# Patient Record
Sex: MALE | Race: WHITE | NOT HISPANIC OR LATINO | Employment: FULL TIME | URBAN - METROPOLITAN AREA
[De-identification: names, ages, dates, MRNs, and addresses within clinical notes are randomized per-mention and may not be internally consistent; named-entity substitution may affect disease eponyms.]

---

## 2017-10-17 ENCOUNTER — CLINICAL SUPPORT (OUTPATIENT)
Dept: OCCUPATIONAL MEDICINE | Facility: CLINIC | Age: 42
End: 2017-10-17
Payer: MEDICAID

## 2017-10-17 VITALS
RESPIRATION RATE: 18 BRPM | OXYGEN SATURATION: 98 % | HEIGHT: 69 IN | HEART RATE: 80 BPM | SYSTOLIC BLOOD PRESSURE: 120 MMHG | TEMPERATURE: 97 F | BODY MASS INDEX: 23.4 KG/M2 | WEIGHT: 158 LBS | DIASTOLIC BLOOD PRESSURE: 72 MMHG

## 2017-10-17 DIAGNOSIS — Z02.1 PHYSICAL EXAM, PRE-EMPLOYMENT: ICD-10-CM

## 2017-10-17 DIAGNOSIS — Z02.1 PRE-EMPLOYMENT DRUG TESTING: Primary | ICD-10-CM

## 2017-10-17 PROCEDURE — 99499 UNLISTED E&M SERVICE: CPT | Mod: S$GLB,,, | Performed by: EMERGENCY MEDICINE

## 2017-10-17 PROCEDURE — 80306 DRUG TEST PRSMV INSTRMNT: CPT | Mod: S$GLB,,, | Performed by: EMERGENCY MEDICINE

## 2017-10-17 NOTE — PROGRESS NOTES
Subjective:       Patient ID: Rubio Mcmanus is a 41 y.o. male.    Chief Complaint: Drug / Alcohol Assessment    HPI  ROS    Objective:      Physical Exam  Physical exam scanned to   Type of Interpretation: ED Physician (Independently Interpreted).  Radiology Procedure Done: Lumbar Spine X-Ray.  Interpretation: No acute findings      Assessment:       1. Pre-employment drug testing    2. Physical exam, pre-employment        Plan:                   Return if symptoms worsen or fail to improve.

## 2019-04-05 ENCOUNTER — OFFICE VISIT (OUTPATIENT)
Dept: URGENT CARE | Facility: CLINIC | Age: 44
End: 2019-04-05
Payer: OTHER MISCELLANEOUS

## 2019-04-05 VITALS
WEIGHT: 180 LBS | HEIGHT: 68 IN | DIASTOLIC BLOOD PRESSURE: 76 MMHG | SYSTOLIC BLOOD PRESSURE: 126 MMHG | BODY MASS INDEX: 27.28 KG/M2 | HEART RATE: 80 BPM | TEMPERATURE: 99 F

## 2019-04-05 DIAGNOSIS — S50.01XA CONTUSION OF RIGHT ELBOW, INITIAL ENCOUNTER: Primary | ICD-10-CM

## 2019-04-05 DIAGNOSIS — Z02.6 ENCOUNTER RELATED TO WORKER'S COMPENSATION CLAIM: ICD-10-CM

## 2019-04-05 DIAGNOSIS — M25.521 RIGHT ELBOW PAIN: ICD-10-CM

## 2019-04-05 PROCEDURE — 99203 PR OFFICE/OUTPT VISIT, NEW, LEVL III, 30-44 MIN: ICD-10-PCS | Mod: S$GLB,,, | Performed by: PHYSICIAN ASSISTANT

## 2019-04-05 PROCEDURE — 73080 XR ELBOW COMPLETE 3 VIEW RIGHT: ICD-10-PCS | Mod: FY,RT,S$GLB, | Performed by: RADIOLOGY

## 2019-04-05 PROCEDURE — 80306 PR DOT DRUG SCREENS: ICD-10-PCS | Mod: S$GLB,,, | Performed by: PHYSICIAN ASSISTANT

## 2019-04-05 PROCEDURE — 99203 OFFICE O/P NEW LOW 30 MIN: CPT | Mod: S$GLB,,, | Performed by: PHYSICIAN ASSISTANT

## 2019-04-05 PROCEDURE — 80306 DRUG TEST PRSMV INSTRMNT: CPT | Mod: S$GLB,,, | Performed by: PHYSICIAN ASSISTANT

## 2019-04-05 PROCEDURE — 73080 X-RAY EXAM OF ELBOW: CPT | Mod: FY,RT,S$GLB, | Performed by: RADIOLOGY

## 2019-04-05 NOTE — PROGRESS NOTES
Subjective:       Patient ID: Rubio Mcmanus Jr. is a 43 y.o. male.    Chief Complaint: Elbow Injury (right 3/29/19 @ 1600)    Patient is a  for [employer]. Patient states on 3/29/19 2 1600 while pulling on a wire the slack gave and he fell back hitting his right elbow on the corner of the barge. Patient continued working his shift that day and every day after but today he made it worse pulling on a line. Patient states no prior injury to area, no treatment. Patient is right handed. Patient c/o a shocking pain to right elbow and tingling in the fingers. Ambulatory. MJB    Elbow Injury   This is a new problem. The current episode started in the past 7 days. The problem occurs daily. Associated symptoms include joint swelling. Pertinent negatives include no abdominal pain, chest pain, chills, coughing, fever, headaches, nausea, neck pain, numbness, rash, sore throat or vomiting. The symptoms are aggravated by exertion and bending. He has tried nothing for the symptoms.     Review of Systems   Constitution: Negative for chills and fever.   HENT: Negative for hearing loss, nosebleeds and sore throat.    Eyes: Negative for blurred vision and redness.   Cardiovascular: Negative for chest pain and syncope.   Respiratory: Negative for cough and shortness of breath.    Hematologic/Lymphatic: Negative for bleeding problem.   Skin: Negative for color change and rash.   Musculoskeletal: Positive for joint pain and joint swelling. Negative for back pain and neck pain.   Gastrointestinal: Negative for abdominal pain, diarrhea, nausea and vomiting.   Neurological: Negative for headaches, numbness and paresthesias.   Psychiatric/Behavioral: The patient is not nervous/anxious.        Objective:      Physical Exam   Constitutional: He appears well-developed and well-nourished. He is active. No distress.   HENT:   Head: Normocephalic and atraumatic.   Right Ear: Hearing and external ear normal.   Left Ear: Hearing and external  ear normal.   Nose: Nose normal. No nasal deformity. No epistaxis.   Mouth/Throat: Oropharynx is clear and moist and mucous membranes are normal.   Eyes: Conjunctivae and lids are normal. No scleral icterus.   Neck: Trachea normal and normal range of motion.   Cardiovascular: Intact distal pulses and normal pulses.   Pulses:       Radial pulses are 2+ on the right side.   Pulmonary/Chest: Effort normal. No stridor. No respiratory distress.   Musculoskeletal:        Right elbow: He exhibits normal range of motion and no swelling. Tenderness found. Medial epicondyle tenderness noted. No radial head, no lateral epicondyle and no olecranon process tenderness noted.   Neurological: He is alert. He has normal strength. He is not disoriented. No sensory deficit. GCS eye subscore is 4. GCS verbal subscore is 5. GCS motor subscore is 6.   Skin: Skin is warm, dry and intact. Capillary refill takes less than 2 seconds. He is not diaphoretic.   Psychiatric: He has a normal mood and affect. His speech is normal and behavior is normal. He is attentive.   Nursing note and vitals reviewed.      Assessment:       1. Contusion of right elbow, initial encounter    2. Encounter related to worker's compensation claim    3. Right elbow pain        Plan:                Restrictions: Regular Duty  Follow up in about 1 week (around 4/12/2019).        Patient Instructions     Elbow Bruise  You have a bruise (contusion) of your elbow. A bruise causes local pain, swelling, and sometimes bruising. There are no broken bones. This injury takes a few days to a few weeks to heal. You may be given a sling for comfort and arm support.  You may notice color changes over the skin. It may change from reddish to bluish to greenish or yellowish before the bruising fades. The skin will then go back to its normal color.  Home care  Follow these guidelines when caring for yourself at home.  · Keep your arm elevated to reduce pain and swelling. This is most  important during the first 2 days (48 hours) after the injury.  · Put an ice pack on the injured area. Do this for 20 minutes every 1 to 2 hours the first day. You can make an ice pack by wrapping a plastic bag of ice in a thin towel. You should continue to use the ice pack 3 to 4 times a day for the next 2 days. Then use the ice pack as needed to ease pain and swelling.  · Dont use a heating pad.  · Dont stick a needle into the contusion or bruising to drain it.  · You may use acetaminophen or ibuprofen to control pain, unless another pain medicine was prescribed. If you have chronic liver or kidney disease, talk with your healthcare provider before using these medicines. Also talk with your provider if youve had a stomach ulcer or gastrointestinal bleeding.  · If a sling was provided, you may take it off to shower or bathe. Dont wear it for more than 1 week or it may cause joint stiffness.  Follow-up care  Follow up with your healthcare provider, or as advised, if you are not starting to get better within the next 3 days.  When to seek medical advice  Call your healthcare provider right away if any of these occur:  · Pain or swelling gets worse  · The back of your elbow becomes very swollen where it almost looks like a gold ball or egg-like mass is growing there. This is a sign of olecrenon bursitis or septic bursitis which may need immediate treatment.  · Redness, red streaks down the arm, warmth, or drainage from the bruise  · Hand or fingers becomes cold, blue, numb, or tingly  · New bruises, and you dont know what caused them  · Contusion doesnt heal  Date Last Reviewed: 2/1/2017  © 0058-8696 [employer]. [address] [place] [address], [place] [address]. All rights reserved. This information is not intended as a substitute for professional medical care. Always follow your healthcare professional's instructions.

## 2019-04-05 NOTE — PATIENT INSTRUCTIONS
Elbow Bruise  You have a bruise (contusion) of your elbow. A bruise causes local pain, swelling, and sometimes bruising. There are no broken bones. This injury takes a few days to a few weeks to heal. You may be given a sling for comfort and arm support.  You may notice color changes over the skin. It may change from reddish to bluish to greenish or yellowish before the bruising fades. The skin will then go back to its normal color.  Home care  Follow these guidelines when caring for yourself at home.  · Keep your arm elevated to reduce pain and swelling. This is most important during the first 2 days (48 hours) after the injury.  · Put an ice pack on the injured area. Do this for 20 minutes every 1 to 2 hours the first day. You can make an ice pack by wrapping a plastic bag of ice in a thin towel. You should continue to use the ice pack 3 to 4 times a day for the next 2 days. Then use the ice pack as needed to ease pain and swelling.  · Dont use a heating pad.  · Dont stick a needle into the contusion or bruising to drain it.  · You may use acetaminophen or ibuprofen to control pain, unless another pain medicine was prescribed. If you have chronic liver or kidney disease, talk with your healthcare provider before using these medicines. Also talk with your provider if youve had a stomach ulcer or gastrointestinal bleeding.  · If a sling was provided, you may take it off to shower or bathe. Dont wear it for more than 1 week or it may cause joint stiffness.  Follow-up care  Follow up with your healthcare provider, or as advised, if you are not starting to get better within the next 3 days.  When to seek medical advice  Call your healthcare provider right away if any of these occur:  · Pain or swelling gets worse  · The back of your elbow becomes very swollen where it almost looks like a gold ball or egg-like mass is growing there. This is a sign of olecrenon bursitis or septic bursitis which may need immediate  treatment.  · Redness, red streaks down the arm, warmth, or drainage from the bruise  · Hand or fingers becomes cold, blue, numb, or tingly  · New bruises, and you dont know what caused them  · Contusion doesnt heal  Date Last Reviewed: 2/1/2017  © 5805-8274 [employer]. [address] [place] [address], [place] [address]. All rights reserved. This information is not intended as a substitute for professional medical care. Always follow your healthcare professional's instructions.

## 2019-04-05 NOTE — LETTER
[facility] [place]  [place] [address]  Phone: [phone]  Fax: [phone]  [facility]: [phone]    Pt Name: Rubio Mcmanus Jr.  Injury Date: 03/29/2019   Employee ID:  Date of Treatment: 04/05/2019   Company: [id number]      Appointment Time: 03:10 PM Arrived: 1520 PM   Provider: Hunter Raymundo PA-C Time Out:1710 PM      Office Treatment:   EXAM  DRUG SCREEN  XRAYS  REGULAR DUTY      1. Contusion of right elbow, initial encounter    2. Encounter related to worker's compensation claim    3. Right elbow pain               Restrictions: Regular Duty     Return Appointment: 04/12/2019@ 0930 AM

## 2020-01-29 ENCOUNTER — OFFICE VISIT (OUTPATIENT)
Dept: URGENT CARE | Facility: CLINIC | Age: 45
End: 2020-01-29
Payer: OTHER MISCELLANEOUS

## 2020-01-29 VITALS
WEIGHT: 180 LBS | SYSTOLIC BLOOD PRESSURE: 124 MMHG | TEMPERATURE: 98 F | OXYGEN SATURATION: 99 % | DIASTOLIC BLOOD PRESSURE: 88 MMHG | RESPIRATION RATE: 19 BRPM | BODY MASS INDEX: 27.28 KG/M2 | HEIGHT: 68 IN | HEART RATE: 76 BPM

## 2020-01-29 DIAGNOSIS — M54.2 NECK PAIN: ICD-10-CM

## 2020-01-29 DIAGNOSIS — S16.1XXA STRAIN OF NECK MUSCLE, INITIAL ENCOUNTER: ICD-10-CM

## 2020-01-29 DIAGNOSIS — M54.50 ACUTE LOW BACK PAIN WITHOUT SCIATICA, UNSPECIFIED BACK PAIN LATERALITY: ICD-10-CM

## 2020-01-29 DIAGNOSIS — M54.6 ACUTE MIDLINE THORACIC BACK PAIN: ICD-10-CM
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
## 2020-01-29 DIAGNOSIS — S20.229A CONTUSION OF BACK, UNSPECIFIED LATERALITY, INITIAL ENCOUNTER: Primary | ICD-10-CM
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
## 2020-01-29 PROCEDURE — 99203 PR OFFICE/OUTPT VISIT, NEW, LEVL III, 30-44 MIN: ICD-10-PCS | Mod: S$GLB,,, | Performed by: PHYSICIAN ASSISTANT

## 2020-01-29 PROCEDURE — 72070 XR THORACIC SPINE AP LATERAL: ICD-10-PCS | Mod: FY,S$GLB,, | Performed by: RADIOLOGY

## 2020-01-29 PROCEDURE — 72070 X-RAY EXAM THORAC SPINE 2VWS: CPT | Mod: FY,S$GLB,, | Performed by: RADIOLOGY

## 2020-01-29 PROCEDURE — 72100 XR LUMBAR SPINE 2 OR 3 VIEWS: ICD-10-PCS | Mod: FY,S$GLB,, | Performed by: RADIOLOGY

## 2020-01-29 PROCEDURE — 99203 OFFICE O/P NEW LOW 30 MIN: CPT | Mod: S$GLB,,, | Performed by: PHYSICIAN ASSISTANT

## 2020-01-29 PROCEDURE — 72040 X-RAY EXAM NECK SPINE 2-3 VW: CPT | Mod: FY,S$GLB,, | Performed by: RADIOLOGY

## 2020-01-29 PROCEDURE — 72040 XR CERVICAL SPINE 2 OR 3 VIEWS: ICD-10-PCS | Mod: FY,S$GLB,, | Performed by: RADIOLOGY

## 2020-01-29 PROCEDURE — 72100 X-RAY EXAM L-S SPINE 2/3 VWS: CPT | Mod: FY,S$GLB,, | Performed by: RADIOLOGY

## 2020-01-29 NOTE — PATIENT INSTRUCTIONS
Back Contusion     You have a contusion to your back. A contusion is also called a bruise. There is swelling and some bleeding under the skin. The skin may be purplish. You may have muscle aching and stiffness in the area of the bruise. There are no broken bones.  Contusions heal on their own, without further treatment. However, pain and skin discoloration may take weeks to months to go away.   Home care  · Rest. Avoid heavy lifting, strenuous exertion, or any activity that causes pain.  · Ice the area to reduce pain and swelling. Put ice cubes in a plastic bag or use a cold pack. (Wrap the cold source in a thin towel. Do not place it directly on your skin.) Ice the injured area for 20 minutes every 1-2 hours the first day. Continue with ice packs 3-4 times a day for the next two days, then as needed for the relief of pain and swelling.  · Take any prescribed pain medication. If none was prescribed, take acetaminophen, ibuprofen, or naproxen to control pain.  Follow-up care  Follow up with your healthcare provider, or as directed. Call if you are not better in 1-2 weeks.  When to seek medical advice  Call your healthcare provider for any of the following:  · New or worsening pain  · Increased swelling around the bruise  · Pain spreads to one or both legs  · Weakness or numbness in one or both legs   · Loss of bowel or bladder control  · Numbness in the groin or genital area  · Fever of 100.4°F (38ºC) or higher, or as directed by your healthcare provider  Date Last Reviewed: 6/26/2015 © 2000-2017 [employer]. [address] [place] [address], [place] [address]. All rights reserved. This information is not intended as a substitute for professional medical care. Always follow your healthcare professional's instructions.        Neck Sprain or Strain  A sudden force that causes turning or bending of the neck can cause sprain or strain. An example would be the force from a car accident. This can stretch or tear  muscles called a strain. It can also stretch or tear ligaments called a sprain. Either of these can cause neck pain. Sometimes neck pain occurs after a simple awkward movement. In either case, muscle spasm is commonly present and contributes to the pain.     Unless you had a forceful physical injury (for example, a car accident or fall), X-rays are usually not ordered for the initial evaluation of neck pain. If pain continues and dose not respond to medical treatment, X-rays and other tests may be performed at a later time.  Home care  · You may feel more soreness and spasm the first few days after the injury. Rest until symptoms begin to improve.  · When lying down, use a comfortable pillow or a rolled towel that supports the head and keeps the spine in a neutral position. The position of the head should not be tilted forward or backward.  · Apply an ice pack over the injured area for 15 to 20 minutes every 3 to 6 hours. You should do this for the first 24 to 48 hours. You can make an ice pack by filling a plastic bag that seals at the top with ice cubes and then wrapping it with a thin towel. After 48 hours, apply heat (warm shower or warm bath) for 15 to 20 minutes several times a day, or alternate ice and heat.  · You may use over-the-counter pain medicine to control pain, unless another pain medicine was prescribed. If you have chronic liver or kidney disease or ever had a stomach ulcer or GI bleeding, talk with your healthcare provider before using these medicines.  · If a soft cervical collar was prescribed, it should be worn only for periods of increased pain. It should not be worn for more than 3 hours a day, or for a period longer than 1 to 2 weeks.  Follow-up care  Follow up with your healthcare provider as directed. Physical therapy may be needed.  Sometimes fractures dont show up on the first X-ray. Bruises and sprains can sometimes hurt as much as a fracture. These injuries can take time to heal  completely. If your symptoms dont improve or they get worse, talk with your healthcare provider. You may need a repeat X-ray or other tests. If X-rays were taken, you will be told of any new findings that may affect your care.  Call 911  Call 911 if you have:  · Neck swelling, difficulty or painful swallowing  · Difficulty breathing  · Chest pain  When to seek medical advice  Call your healthcare provider right away if any of these occur:  · Pain becomes worse or spreads into your arms  · Weakness or numbness in one or both arms  Date Last Reviewed: 11/19/2015  © 2208-0797 [employer]. [address] [place] [address]. All rights reserved. This information is not intended as a substitute for professional medical care. Always follow your healthcare professional's instructions.        Neck Clock (Flexibility)    1. Lie on your back on the floor, with your knees bent and your feet flat on the floor. Place a rolled-up towel or neck roll under your neck.  2. Close your eyes and imagine a clock face. With your nose, slowly trace the outer edge of the clock in a clockwise direction. Move your neck smoothly. Dont force your head or neck.  3. Repeat 5 times, or as instructed.  4. Then switch to a counterclockwise direction, and repeat the exercise 5 times, or as instructed.     Challenge yourself  You can also do this exercise while sitting at your work desk. Sit up straight with your back supported firmly against your chair. You can do the exercise several times throughout your day.   Date Last Reviewed: 3/10/2016  © 7903-8259 [employer]. [address] [place] [address]. All rights reserved. This information is not intended as a substitute for professional medical care. Always follow your healthcare professional's instructions.

## 2020-01-29 NOTE — PROGRESS NOTES
Subjective:       Patient ID: Rubio Mcmanus Jr. is a 44 y.o. male.    Chief Complaint: Back Pain    Pt works for [employer] as a . 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 IJ    44-year-old male presents with back and neck pain after slipping and falling flat on his back from ground level just prior to arrival.  Patient denies any radicular symptoms of all extremities.  Patient denies bowel or bladder incontinence or saddle paresthesias.      Back Pain   This is a new problem. The current episode started today. The problem occurs constantly. The problem has been gradually worsening since onset. The pain is present in the lumbar spine and thoracic spine. The quality of the pain is described as aching, cramping and shooting. The pain does not radiate. The pain is at a severity of 10/10. The pain is moderate. The pain is the same all the time. The symptoms are aggravated by bending, twisting and sitting. Pertinent negatives include no abdominal pain, bladder incontinence, bowel incontinence, chest pain, dysuria, fever, numbness or tingling. He has tried nothing for the symptoms. The treatment provided no relief.       Constitution: Negative for chills, fatigue and fever.   HENT: Negative for facial trauma.    Neck: Negative for neck pain and neck stiffness.   Cardiovascular: Negative for chest trauma and chest pain.   Eyes: Negative for eye trauma and eye pain.   Respiratory: Negative for shortness of breath.    Gastrointestinal: Negative for abdominal trauma, abdominal pain and bowel incontinence.   Genitourinary: Negative for dysuria, urgency, bladder incontinence, hematuria and history of kidney stones.   Musculoskeletal: Positive for pain, trauma, back pain, pain with walking and muscle cramps. Negative for joint pain, abnormal ROM of joint and history of spine disorder.   Skin: Negative for rash and wound.   Neurological: Negative for  coordination disturbances, numbness and tingling.        Objective:      Physical Exam   Constitutional: He appears well-developed and well-nourished. He is active. He appears distressed (Mild distress from back pain).   HENT:   Head: Normocephalic and atraumatic.   Right Ear: Hearing and external ear normal.   Left Ear: Hearing and external ear normal.   Nose: Nose normal. No nasal deformity. No epistaxis.   Mouth/Throat: Oropharynx is clear and moist and mucous membranes are normal.   Eyes: Conjunctivae and lids are normal. Right conjunctiva is not injected. Left conjunctiva is not injected.   Neck: Trachea normal. Muscular tenderness present. No spinous process tenderness present. Decreased range of motion ( AROM: full flexion, extension limited to 20°, full left and right rotation) present.   Cardiovascular: Intact distal pulses and normal pulses.   Pulses:       Dorsalis pedis pulses are 2+ on the right side, and 2+ on the left side.        Posterior tibial pulses are 2+ on the right side, and 2+ on the left side.   Pulmonary/Chest: Effort normal. No stridor. No respiratory distress.   Abdominal: Normal appearance.   Musculoskeletal:        Cervical back: He exhibits decreased range of motion and tenderness.        Thoracic back: He exhibits decreased range of motion and tenderness.        Lumbar back: He exhibits decreased range of motion (Markedly decreased flexion, extension, rotation at the waist.) and tenderness. He exhibits no deformity and normal pulse.   Neurological: He is alert. He has normal strength and normal reflexes. No sensory deficit. GCS eye subscore is 4. GCS verbal subscore is 5. GCS motor subscore is 6.   Reflex Scores:       Patellar reflexes are 2+ on the right side and 2+ on the left side.       Achilles reflexes are 2+ on the right side and 2+ on the left side.  SLR negative bilaterally.    Skin: Skin is warm, dry and intact. No abrasion and no bruising noted.   Psychiatric: He has a  normal mood and affect. His speech is normal and behavior is normal. Thought content normal. Cognition and memory are normal. He is attentive.   Nursing note and vitals reviewed.        X-ray Thoracic Spine Ap Lateral    Result Date: 1/29/2020  EXAMINATION: XR THORACIC SPINE AP LATERAL CLINICAL HISTORY: slip and fall onto back ground level;  Pain in thoracic spine TECHNIQUE: AP and lateral views of the thoracic spine were performed. COMPARISON: None FINDINGS: No fracture or dislocation.  No bone destruction identified     See above Electronically signed by: Musa Vivas MD Date:    01/29/2020 Time:    10:28    X-ray Lumbar Spine 2 Or 3 Views    Result Date: 1/29/2020  EXAMINATION: XR LUMBAR SPINE 2 OR 3 VIEWS CLINICAL HISTORY: Low back pain, <6wks, no red flags, no prior management;slip and fall onto back ground level;  Low back pain TECHNIQUE: Three views lumbar spine COMPARISON: No 10/17/2017 ne FINDINGS: Mild narrowing of the lumbosacral disc space.  Other disc spaces are well maintained.  No fracture, spondylolisthesis or bone destruction identified I : See above Electronically signed by: Musa Vivas MD Date:    01/29/2020 Time:    10:17    X-ray Cervical Spine 2 Or 3 Views    Result Date: 1/29/2020  EXAMINATION: XR CERVICAL SPINE 2 OR 3 VIEWS CLINICAL HISTORY: slip and fall onto back ground level; Cervicalgia TECHNIQUE: AP, lateral and open mouth views of the cervical spine were performed. COMPARISON: None. FINDINGS: Mild motion artifact on lateral exam.  Suboptimal visualization of the lower endplate of C7 on lateral exam. With respect to visualized cervical spine on 3 images obtained, normal alignment, no definite acute fracture with preserved predental space and prevertebral soft tissues.  No areas of obvious disc space narrowing.  The odontoid tip and C1-C2 articulation unremarkable.     Based on images submitted as noted, no acute cervical vertebral body fractures.  See above discussion. Electronically  signed by: David Pedraza Date:    01/29/2020 Time:    10:21    Assessment:       1. Contusion of back, unspecified laterality, initial encounter    2. Acute low back pain without sciatica, unspecified back pain laterality    3. Neck pain    4. Acute midline thoracic back pain    5. Fall on same level from slipping, tripping and stumbling with subsequent striking against unspecified object, initial encounter    6. Strain of neck muscle, initial encounter    7. Work related injury        Plan:       Patient to follow up Monday to reassess.  Patient is off duty from now until next Wednesday.     Patient Instructions: Apply ice 24-48 hours then apply heat/warm soaks(Over-the-counter ibuprofen or Aleve as needed for pain.)   Restrictions: Home today, Regular Duty  Follow up in about 5 days (around 2/3/2020).        Patient Instructions       Back Contusion     You have a contusion to your back. A contusion is also called a bruise. There is swelling and some bleeding under the skin. The skin may be purplish. You may have muscle aching and stiffness in the area of the bruise. There are no broken bones.  Contusions heal on their own, without further treatment. However, pain and skin discoloration may take weeks to months to go away.   Home care  · Rest. Avoid heavy lifting, strenuous exertion, or any activity that causes pain.  · Ice the area to reduce pain and swelling. Put ice cubes in a plastic bag or use a cold pack. (Wrap the cold source in a thin towel. Do not place it directly on your skin.) Ice the injured area for 20 minutes every 1-2 hours the first day. Continue with ice packs 3-4 times a day for the next two days, then as needed for the relief of pain and swelling.  · Take any prescribed pain medication. If none was prescribed, take acetaminophen, ibuprofen, or naproxen to control pain.  Follow-up care  Follow up with your healthcare provider, or as directed. Call if you are not better in 1-2 weeks.  When to seek  medical advice  Call your healthcare provider for any of the following:  · New or worsening pain  · Increased swelling around the bruise  · Pain spreads to one or both legs  · Weakness or numbness in one or both legs   · Loss of bowel or bladder control  · Numbness in the groin or genital area  · Fever of 100.4°F (38ºC) or higher, or as directed by your healthcare provider  Date Last Reviewed: 6/26/2015 © 2000-2017 [employer]. [address] [place] [address], [place] [address]. All rights reserved. This information is not intended as a substitute for professional medical care. Always follow your healthcare professional's instructions.        Neck Sprain or Strain  A sudden force that causes turning or bending of the neck can cause sprain or strain. An example would be the force from a car accident. This can stretch or tear muscles called a strain. It can also stretch or tear ligaments called a sprain. Either of these can cause neck pain. Sometimes neck pain occurs after a simple awkward movement. In either case, muscle spasm is commonly present and contributes to the pain.     Unless you had a forceful physical injury (for example, a car accident or fall), X-rays are usually not ordered for the initial evaluation of neck pain. If pain continues and dose not respond to medical treatment, X-rays and other tests may be performed at a later time.  Home care  · You may feel more soreness and spasm the first few days after the injury. Rest until symptoms begin to improve.  · When lying down, use a comfortable pillow or a rolled towel that supports the head and keeps the spine in a neutral position. The position of the head should not be tilted forward or backward.  · Apply an ice pack over the injured area for 15 to 20 minutes every 3 to 6 hours. You should do this for the first 24 to 48 hours. You can make an ice pack by filling a plastic bag that seals at the top with ice cubes and then wrapping it with a thin  towel. After 48 hours, apply heat (warm shower or warm bath) for 15 to 20 minutes several times a day, or alternate ice and heat.  · You may use over-the-counter pain medicine to control pain, unless another pain medicine was prescribed. If you have chronic liver or kidney disease or ever had a stomach ulcer or GI bleeding, talk with your healthcare provider before using these medicines.  · If a soft cervical collar was prescribed, it should be worn only for periods of increased pain. It should not be worn for more than 3 hours a day, or for a period longer than 1 to 2 weeks.  Follow-up care  Follow up with your healthcare provider as directed. Physical therapy may be needed.  Sometimes fractures dont show up on the first X-ray. Bruises and sprains can sometimes hurt as much as a fracture. These injuries can take time to heal completely. If your symptoms dont improve or they get worse, talk with your healthcare provider. You may need a repeat X-ray or other tests. If X-rays were taken, you will be told of any new findings that may affect your care.  Call 911  Call 911 if you have:  · Neck swelling, difficulty or painful swallowing  · Difficulty breathing  · Chest pain  When to seek medical advice  Call your healthcare provider right away if any of these occur:  · Pain becomes worse or spreads into your arms  · Weakness or numbness in one or both arms  Date Last Reviewed: 11/19/2015  © 1113-0105 [employer]. [address] [place] [address]. All rights reserved. This information is not intended as a substitute for professional medical care. Always follow your healthcare professional's instructions.        Neck Clock (Flexibility)    1. Lie on your back on the floor, with your knees bent and your feet flat on the floor. Place a rolled-up towel or neck roll under your neck.  2. Close your eyes and imagine a clock face. With your nose, slowly trace the outer edge of the clock in a clockwise  direction. Move your neck smoothly. Dont force your head or neck.  3. Repeat 5 times, or as instructed.  4. Then switch to a counterclockwise direction, and repeat the exercise 5 times, or as instructed.     Challenge yourself  You can also do this exercise while sitting at your work desk. Sit up straight with your back supported firmly against your chair. You can do the exercise several times throughout your day.   Date Last Reviewed: 3/10/2016  © 7295-4028 [employer]. [address] [place] [address]. All rights reserved. This information is not intended as a substitute for professional medical care. Always follow your healthcare professional's instructions.

## 2020-01-29 NOTE — LETTER
[facility]  [place]  [address] [address]  Phone: [phone]  Fax: [phone]  [facility]: [id number]    Pt Name: Rubio Mcmanus Jr.  Injury Date: 01/29/2020   Employee ID: [id number] Date of First Treatment: 01/29/2020   Company: [employer]      Appointment Time: 08:35 AM Arrived: 08:34AM   Provider: Hunter Raymundo PA-C Time Out:10:45 AM     Office Treatment:   1. Contusion of back, unspecified laterality, initial encounter    2. Acute low back pain without sciatica, unspecified back pain laterality    3. Neck pain    4. Acute midline thoracic back pain    5. Fall on same level from slipping, tripping and stumbling with subsequent striking against unspecified object, initial encounter    6. Strain of neck muscle, initial encounter    7. Work related injury          Patient Instructions: Apply ice 24-48 hours then apply heat/warm soaks(Over-the-counter ibuprofen or Aleve as needed for pain.)    Restrictions: Home today, Regular Duty     Return Appointment: 2/3/2020 at 2:00 PM  JASON

## 2020-02-03 ENCOUNTER — OFFICE VISIT (OUTPATIENT)
Dept: URGENT CARE | Facility: CLINIC | Age: 45
End: 2020-02-03
Payer: OTHER MISCELLANEOUS

## 2020-02-03 DIAGNOSIS — S20.229D CONTUSION OF BACK, UNSPECIFIED LATERALITY, SUBSEQUENT ENCOUNTER: ICD-10-CM
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
## 2020-02-03 DIAGNOSIS — S16.1XXD STRAIN OF NECK MUSCLE, SUBSEQUENT ENCOUNTER: Primary | ICD-10-CM

## 2020-02-03 DIAGNOSIS — M62.838 NECK MUSCLE SPASM: ICD-10-CM

## 2020-02-03 PROCEDURE — 99214 OFFICE O/P EST MOD 30 MIN: CPT | Mod: S$GLB,,, | Performed by: PHYSICIAN ASSISTANT

## 2020-02-03 PROCEDURE — 99214 PR OFFICE/OUTPT VISIT, EST, LEVL IV, 30-39 MIN: ICD-10-PCS | Mod: S$GLB,,, | Performed by: PHYSICIAN ASSISTANT

## 2020-02-03 RX ORDER — IBUPROFEN 600 MG/1
600 TABLET ORAL EVERY 6 HOURS PRN
Qty: 30 TABLET | Refills: 0 | Status: SHIPPED | OUTPATIENT
Start: 2020-02-03

## 2020-02-03 RX ORDER — TIZANIDINE 4 MG/1
4 TABLET ORAL 2 TIMES DAILY PRN
Qty: 20 TABLET | Refills: 0 | Status: SHIPPED | OUTPATIENT
Start: 2020-02-03

## 2020-02-03 NOTE — PROGRESS NOTES
Subjective:       Patient ID: Rubio Mcmanus Jr. is a 44 y.o. male.    Chief Complaint: Back Pain    Pt returned to the clinic today for a follow up visit for back pain. Pt states pain today is a 5/10 CONSTANT. IJ    Patient reports minimal improvements since injury 5 days ago.  Patient reports moderate to severe left neck pain and low back pain without radicular symptoms.  Patient reports pain is worse in the morning when he is very stiff.    Back Pain   This is a recurrent problem. The current episode started 1 to 4 weeks ago. The problem occurs constantly. The problem has been gradually improving since onset. The pain is present in the lumbar spine and thoracic spine. The quality of the pain is described as aching, cramping and shooting. The pain does not radiate. The pain is at a severity of 6/10. The pain is moderate. The pain is the same all the time. The symptoms are aggravated by bending, twisting and sitting. Pertinent negatives include no abdominal pain, bladder incontinence, bowel incontinence, chest pain, dysuria, fever, numbness or tingling. He has tried nothing for the symptoms. The treatment provided no relief.       Constitution: Negative for fatigue and fever.   Neck: Positive for neck pain and neck stiffness.   Cardiovascular: Negative for chest pain.   Gastrointestinal: Negative for abdominal pain and bowel incontinence.   Genitourinary: Negative for dysuria, urgency, bladder incontinence and hematuria.   Musculoskeletal: Positive for pain, trauma and back pain. Negative for muscle cramps and history of spine disorder.   Skin: Negative for rash.   Neurological: Negative for coordination disturbances, numbness and tingling.        Objective:      Physical Exam   Constitutional: He appears well-developed and well-nourished. He is active. He appears distressed (Mild distress from back pain).   HENT:   Head: Normocephalic and atraumatic.   Right Ear: Hearing and external ear normal.   Left Ear: Hearing  and external ear normal.   Nose: Nose normal. No nasal deformity. No epistaxis.   Mouth/Throat: Oropharynx is clear and moist and mucous membranes are normal.   Eyes: Conjunctivae and lids are normal. Right conjunctiva is not injected. Left conjunctiva is not injected.   Neck: Trachea normal. Muscular tenderness present. No spinous process tenderness present. Decreased range of motion ( AROM: full flexion, extension limited to 20°, full left and right rotation) present.   Cardiovascular: Intact distal pulses and normal pulses.   Pulses:       Dorsalis pedis pulses are 2+ on the right side, and 2+ on the left side.        Posterior tibial pulses are 2+ on the right side, and 2+ on the left side.   Pulmonary/Chest: Effort normal. No stridor. No respiratory distress.   Abdominal: Normal appearance.   Musculoskeletal:        Cervical back: He exhibits decreased range of motion and tenderness.        Thoracic back: He exhibits decreased range of motion and tenderness.        Lumbar back: He exhibits decreased range of motion (Markedly decreased flexion, extension, rotation at the waist.) and tenderness. He exhibits no deformity and normal pulse.   Neurological: He is alert. He has normal strength and normal reflexes. No sensory deficit. GCS eye subscore is 4. GCS verbal subscore is 5. GCS motor subscore is 6.   Reflex Scores:       Patellar reflexes are 2+ on the right side and 2+ on the left side.       Achilles reflexes are 2+ on the right side and 2+ on the left side.  SLR negative bilaterally.    Skin: Skin is warm, dry and intact. No abrasion and no bruising noted.   Psychiatric: He has a normal mood and affect. His speech is normal and behavior is normal. Thought content normal. Cognition and memory are normal. He is attentive.   Nursing note and vitals reviewed.      Assessment:       1. Strain of neck muscle, subsequent encounter    2. Contusion of back, unspecified laterality, subsequent encounter    3. Neck  muscle spasm    4. Work related injury        Plan:         Medications Ordered This Encounter   Medications    ibuprofen (ADVIL,MOTRIN) 600 MG tablet     Sig: Take 1 tablet (600 mg total) by mouth every 6 (six) hours as needed.     Dispense:  30 tablet     Refill:  0    tiZANidine (ZANAFLEX) 4 MG tablet     Sig: Take 1 tablet (4 mg total) by mouth 2 (two) times daily as needed (muscle spasms). Take off duty only. May cause drowsiness.     Dispense:  20 tablet     Refill:  0     Patient Instructions: Daily home exercises/warm soaks   Restrictions: Avoid frequent bending/lifting/twisting, No lifting/pushing/pulling more than 25 lbs(May perform yard work or office duty only)  Follow up in about 4 days (around 2/7/2020).

## 2020-02-03 NOTE — LETTER
[facility] [place]  [address] [address]  Phone: [phone]  Fax: [phone]  [facility]: [id number]    Pt Name: Rubio Mcmanus Jr.  Injury Date: 01/29/2020   Employee ID: [id number] Date of Treatment: 02/03/2020   Company: [employer]      Appointment Time: 01:45 PM Arrived: 12:45 PM   Provider: Hunter Raymundo PA-C Time Out: 2:00 PM     Office Treatment:   EXAM  RX GIVEN  LIGHT DUTY     1. Strain of neck muscle, subsequent encounter    2. Contusion of back, unspecified laterality, subsequent encounter    3. Neck muscle spasm    4. Work related injury      Medications Ordered This Encounter   Medications    ibuprofen (ADVIL,MOTRIN) 600 MG tablet      Patient Instructions: Daily home exercises/warm soaks    Restrictions: Avoid frequent bending/lifting/twisting, No lifting/pushing/pulling more than 25 lbs(May perform yard work or office duty only)     Return Appointment: 02/07/2020 at 9:00 PM  DANNY

## 2020-02-07 ENCOUNTER — OFFICE VISIT (OUTPATIENT)
Dept: URGENT CARE | Facility: CLINIC | Age: 45
End: 2020-02-07
Payer: OTHER MISCELLANEOUS

## 2020-02-07 DIAGNOSIS — S20.229D CONTUSION OF BACK, UNSPECIFIED LATERALITY, SUBSEQUENT ENCOUNTER: ICD-10-CM
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
## 2020-02-07 DIAGNOSIS — S16.1XXD STRAIN OF NECK MUSCLE, SUBSEQUENT ENCOUNTER: Primary | ICD-10-CM

## 2020-02-07 PROCEDURE — 99214 OFFICE O/P EST MOD 30 MIN: CPT | Mod: S$GLB,,, | Performed by: PHYSICIAN ASSISTANT

## 2020-02-07 PROCEDURE — 99214 PR OFFICE/OUTPT VISIT, EST, LEVL IV, 30-39 MIN: ICD-10-PCS | Mod: S$GLB,,, | Performed by: PHYSICIAN ASSISTANT

## 2020-02-07 NOTE — LETTER
[facility] [place]  [address] [address]  Phone: [phone]  Fax: [phone]  [facility]: [id number]    Pt Name: Rubio Mcmanus Jr.  Injury Date: 01/29/2020   Employee ID: [id number] Date of Treatment: 02/07/2020   Company: [employer]      Appointment Time: 08:45 AM Arrived: 8:50 AM   Provider: Hunter Raymundo PA-C Time Out: 9:55 AM     Office Treatment:   EXAM  DISCHARGED FROM [facility]   REGULAR DUTY     1. Strain of neck muscle, subsequent encounter    2. Contusion of back, unspecified laterality, subsequent encounter    3. Work related injury          Patient Instructions: (Ibuprofen 600 mg by mouth every 6 hr as needed for pain.)    Restrictions: Regular Duty, Discharged from Occupational Health     Return Appointment: NONE

## 2020-02-07 NOTE — PROGRESS NOTES
Subjective:       Patient ID: Rubio Mcmanus Jr. is a 44 y.o. male.    Chief Complaint: Back Pain (Lower back)    W/C f/u- Lower back- Patient states his lower back feels a lot better and he only has minor pain.He describes the pain as a sharp pain when it happens. Pain level 1/10 on today. He is currently taking Ibuprofen 600 mg and Tizanidine. [place]    Back Pain   This is a recurrent problem. The current episode started 1 to 4 weeks ago. The problem occurs intermittently. The problem has been gradually improving since onset. The pain is present in the lumbar spine. Quality: sharp. The pain does not radiate. The pain is at a severity of 1/10. The pain is mild. The pain is worse during the day. The symptoms are aggravated by bending. Stiffness is present in the morning. Pertinent negatives include no abdominal pain, bladder incontinence, bowel incontinence, chest pain, dysuria, fever, headaches, leg pain, numbness, paresis, paresthesias, pelvic pain, perianal numbness, tingling, weakness or weight loss. He has tried muscle relaxant, NSAIDs and heat for the symptoms. The treatment provided moderate relief.       Constitution: Negative for chills, fatigue and fever.   HENT: Negative.  Negative for facial trauma.    Neck: negative. Negative for neck pain and neck stiffness.   Cardiovascular: Negative.  Negative for chest trauma and chest pain.   Eyes: Negative.  Negative for eye trauma and eye pain.   Respiratory: Negative.  Negative for shortness of breath.    Gastrointestinal: Negative for abdominal trauma, abdominal pain and bowel incontinence.   Genitourinary: Negative for dysuria, urgency, bladder incontinence, hematuria, history of kidney stones and pelvic pain.   Musculoskeletal: Positive for pain and back pain. Negative for joint pain, abnormal ROM of joint, pain with walking, muscle cramps and history of spine disorder.   Skin: Negative for rash and wound.   Allergic/Immunologic: Negative.    Neurological: Negative  for coordination disturbances, headaches, numbness and tingling.   Hematologic/Lymphatic: Negative.    Psychiatric/Behavioral: Negative.         Objective:      Physical Exam   Constitutional: He appears well-developed and well-nourished. He is active. No distress.   HENT:   Head: Normocephalic and atraumatic.   Right Ear: Hearing and external ear normal.   Left Ear: Hearing and external ear normal.   Nose: Nose normal. No nasal deformity. No epistaxis.   Mouth/Throat: Oropharynx is clear and moist and mucous membranes are normal.   Eyes: Conjunctivae and lids are normal. Right conjunctiva is not injected. Left conjunctiva is not injected.   Neck: Trachea normal and normal range of motion. No spinous process tenderness and no muscular tenderness present.   Cardiovascular: Intact distal pulses and normal pulses.   Pulses:       Dorsalis pedis pulses are 2+ on the right side, and 2+ on the left side.        Posterior tibial pulses are 2+ on the right side, and 2+ on the left side.   Pulmonary/Chest: Effort normal. No stridor. No respiratory distress.   Abdominal: Normal appearance.   Musculoskeletal:        Cervical back: Normal.        Thoracic back: Normal.        Lumbar back: He exhibits tenderness. He exhibits normal range of motion, no deformity and normal pulse.        Back:    Neurological: He is alert. He has normal strength. No sensory deficit. GCS eye subscore is 4. GCS verbal subscore is 5. GCS motor subscore is 6.   Skin: Skin is warm, dry and intact. No abrasion and no bruising noted.   Psychiatric: He has a normal mood and affect. His speech is normal and behavior is normal. Thought content normal. Cognition and memory are normal. He is attentive.   Nursing note reviewed.      Assessment:       1. Strain of neck muscle, subsequent encounter    2. Contusion of back, unspecified laterality, subsequent encounter    3. Work related injury        Plan:       Patient moves well, appears to be much improved.   Will release to regular duty with instructions to RTC if pain gets worse.     Patient Instructions: (Ibuprofen 600 mg by mouth every 6 hr as needed for pain.)   Restrictions: Regular Duty, Discharged from Occupational Health  Follow up if symptoms worsen or fail to improve.

## 2020-03-09 ENCOUNTER — TELEPHONE (OUTPATIENT)
Dept: URGENT CARE | Facility: CLINIC | Age: 45
End: 2020-03-09

## 2020-03-09 NOTE — TELEPHONE ENCOUNTER
For the work comp injury last seen on 2/7, Tru Shepard states this injury has been closed and no follow up treatments are authorized.